# Patient Record
Sex: FEMALE | URBAN - METROPOLITAN AREA
[De-identification: names, ages, dates, MRNs, and addresses within clinical notes are randomized per-mention and may not be internally consistent; named-entity substitution may affect disease eponyms.]

---

## 2023-12-29 DIAGNOSIS — R52 PAIN: Primary | ICD-10-CM

## 2024-01-04 ENCOUNTER — OFFICE VISIT (OUTPATIENT)
Dept: ORTHOPEDIC SURGERY | Age: 39
End: 2024-01-04

## 2024-01-04 VITALS — WEIGHT: 229 LBS

## 2024-01-04 DIAGNOSIS — R52 PAIN: ICD-10-CM

## 2024-01-04 DIAGNOSIS — M25.312 SHOULDER INSTABILITY, LEFT: Primary | ICD-10-CM

## 2024-01-04 PROCEDURE — 99203 OFFICE O/P NEW LOW 30 MIN: CPT

## 2024-01-04 ASSESSMENT — ENCOUNTER SYMPTOMS: COLOR CHANGE: 0
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
